# Patient Record
Sex: MALE | Race: WHITE | Employment: FULL TIME | URBAN - METROPOLITAN AREA
[De-identification: names, ages, dates, MRNs, and addresses within clinical notes are randomized per-mention and may not be internally consistent; named-entity substitution may affect disease eponyms.]

---

## 2022-02-09 NOTE — PROGRESS NOTES
CARDIOLOGY CONSULTATION        Patient Name: Gianni Rasmussen  Primary Care physician: No primary care provider on file. Reason for Referral/Chief Complaint: Gianni Rasmussen is a 42 y.o. patient who is referred to cardiology clinic today for evaluation and treatment of chest pain. History of Present Illness:   Gianni Rasmussen is a 42 y.o. male here today as a new patient for evaluation of chest pain. He has a medical history including ***. He was referred by ***. Today ***    The patient denies chest pain, shortness of breath at rest and dyspnea with exertion. Denies palpitations, dizziness, near-syncope or pieter syncope. Denies paroxysmal nocturnal dyspnea, orthopnea, bendopnea, increasing lower extremity edema or weight gain. The patient endorses highest level of activity as ***. Past Medical History:   has no past medical history on file. Surgical History:   has no past surgical history on file. Social History:        Family History:  family history is not on file. Home Medications:  Were reviewed and are listed in nursing record and/or below  Prior to Admission medications    Not on File        CURRENT Medications:  No current facility-administered medications for this visit. Allergies:  Patient has no allergy information on record. Review of Systems:   A 14 point review of symptoms completed. Pertinent positives identified in the HPI, all other review of symptoms negative as below. Objective: There were no vitals filed for this visit. PHYSICAL EXAM:    ***  General:  Alert, cooperative, no distress, appears stated age   Head:  Normocephalic, atraumatic   Eyes:  Conjunctiva/corneas clear, anicteric sclerae    Nose: Nares normal, no drainage or sinus tenderness   Throat: No abnormalities of the lips, oral mucosa or tongue. Neck: Trachea midline.  Neck supple with no lymphadenopathy, thyroid not enlarged, symmetric, no tenderness/mass/nodules, no Jugular venous pressure elevation    Lungs:   Clear to auscultation bilaterally, no wheezes, no rales, no respiratory distress   Chest Wall:  No deformity or tenderness to palpation   Heart:  Regular rate and rhythm, normal S1, normal S2, no murmur, no rub, no S3/S4, PMI non-displaced. Abdomen:   Soft, non-tender, with normoactive bowel sounds. No masses, no hepatosplenomegaly   Extremities: No cyanosis, clubbing or pitting edema. Vascular: 2+ radial, brachial, femoral, dorsalis pedis and posterior tibial pulses bilaterally. Brisk carotid upstrokes without carotid bruit. Skin: Skin color, texture, turgor are normal with no rashes or ulceration. Pysch: Euthymic mood, appropriate affect   Neurologic: Oriented to person, place and time. No slurred speech or facial asymmetry. No motor or sensory deficits on gross examination. Labs:   CBC: No results found for: WBC, RBC, HGB, HCT, MCV, RDW, PLT  CMP:No results found for: NA, K, CL, CO2, BUN, CREATININE, GFRAA, AGRATIO, LABGLOM, GLUCOSE, PROT, CALCIUM, BILITOT, ALKPHOS, AST, ALT  PT/INR:  No results found for: PTINR  HgBA1c:No results found for: LABA1C  No results found for: CKTOTAL, CKMB, CKMBINDEX, TROPONINI      Cardiac Data:     EKG:     Echo:    Stress Test:    Cardiac catheterization:    Additional studies:     Impression and Plan:      1. There is no problem list on file for this patient. Patient Plan:  1. ***          ***      ***      I will address the patient's cardiac risk factors and adjusted pharmacologic treatment as needed. In addition, I have reinforced the need for patient directed risk factor modification. All questions and concerns were addressed to the patient/family. Alternatives to my treatment were discussed. Thank you for allowing us to participate in the care of Mariana Cantrell. Please call me with any questions [phone].     Mary Bradford MD, [address]  ([address]) [address]  [phone] [address] [place] [address]  2/9/2022 1:08 PM

## 2022-02-11 ENCOUNTER — OFFICE VISIT (OUTPATIENT)
Dept: CARDIOLOGY CLINIC | Age: 42
End: 2022-02-11
Payer: COMMERCIAL

## 2022-02-11 VITALS
HEIGHT: 74 IN | WEIGHT: 296 LBS | DIASTOLIC BLOOD PRESSURE: 62 MMHG | HEART RATE: 92 BPM | BODY MASS INDEX: 37.99 KG/M2 | OXYGEN SATURATION: 98 % | SYSTOLIC BLOOD PRESSURE: 112 MMHG

## 2022-02-11 DIAGNOSIS — Z76.89 ESTABLISHING CARE WITH NEW DOCTOR, ENCOUNTER FOR: Primary | ICD-10-CM

## 2022-02-11 DIAGNOSIS — E78.5 HYPERLIPIDEMIA, UNSPECIFIED HYPERLIPIDEMIA TYPE: ICD-10-CM

## 2022-02-11 DIAGNOSIS — I10 PRIMARY HYPERTENSION: ICD-10-CM

## 2022-02-11 DIAGNOSIS — R07.9 CHEST PAIN, UNSPECIFIED TYPE: ICD-10-CM

## 2022-02-11 PROCEDURE — 93000 ELECTROCARDIOGRAM COMPLETE: CPT | Performed by: INTERNAL MEDICINE

## 2022-02-11 PROCEDURE — 99204 OFFICE O/P NEW MOD 45 MIN: CPT | Performed by: INTERNAL MEDICINE

## 2022-02-11 RX ORDER — CARVEDILOL 6.25 MG/1
6.25 TABLET ORAL 2 TIMES DAILY WITH MEALS
COMMUNITY

## 2022-02-11 NOTE — PROGRESS NOTES
CARDIOLOGY CONSULTATION        Patient Name: Shannan Mchugh  Primary Care physician: No primary care provider on file. Reason for Referral/Chief Complaint: Shannan Mchugh is a 42 y.o. patient who is referred to cardiology clinic today for evaluation and treatment of chest pain. History of Present Illness:   Shannan Mchugh is a 42 y.o. male here today as a new patient for evaluation of chest pain. He has a medical history including hypertension, tobacco abuse,  chest pain, and chronic leg pain. Today 2/11/22, he reports he states that his chest pain feels like a vice , band-like discomfort around his chest. It is associated with a stabbing pain that travels up into his shoulder at times as well. He was recently evaluated by Dr. Jeffry Kimball with [facility]. He underwent a stress echo as well as a exercise stress test which showed no evidence of stress induced ischemia by EKG though IVCD noted at baseline. Echo images were sub-optimal post-exercise without evidence of ischemia. He performed a good work load. Reports these had chest pain last 1 week ago. States he is taken his medication for his blood pressure each day since that time as prescribed and has not had pain since. Shocked by his blood pressure today which is improved. Continues to work without limitations. He also has a history of lower leg pain. Describes this as sharp pain at the bottom of his foot. Some exertional component. Vascular studies/CARLOTA were normal at his recent hospitalization evaluation. Denies palpitations, dizziness, near-syncope or pieter syncope. Denies paroxysmal nocturnal dyspnea, orthopnea, bendopnea, increasing lower extremity edema or weight gain. The patient endorses highest level of activity as working as a . Continues to smoke 1 PPD x about 25 years.      Past Medical History:  Chest pain  Hypertension  Obesity  Tobacco abuse  Leg pain     Surgical History:  Patient denies prior surgical history. Social History:   reports that he has been smoking cigarettes. He has never used smokeless tobacco. He reports current alcohol use. He reports previous drug use. Family History:  Father- AAA repair  Mother- COPD  Sister- healthy  Brother- alcoholic, esophageal varices (deseased)        Home Medications:  Were reviewed and are listed in nursing record and/or below  Prior to Admission medications    Medication Sig Start Date End Date Taking? Authorizing Provider   carvedilol (COREG) 6.25 MG tablet Take 6.25 mg by mouth 2 times daily (with meals)   Yes Historical Provider, MD      Patient states she is taking over-the-counter medications including:  Coenzyme Q 10  L-arginine    CURRENT Medications:  No current facility-administered medications for this visit. Allergies:  Pc pen vk [penicillin v]     Review of Systems:   A 14 point review of symptoms completed. Pertinent positives identified in the HPI, all other review of symptoms negative as below. Objective:     Vitals:    02/11/22 0923   BP: 112/62   Pulse: 92   SpO2: 98%    Weight: 296 lb (134.3 kg)       PHYSICAL EXAM:    General:  Alert, cooperative, no distress, appears stated age   Head:  Normocephalic, atraumatic   Eyes:  Conjunctiva/corneas clear, anicteric sclerae    Nose: Nares normal, no drainage or sinus tenderness   Throat: No abnormalities of the lips, oral mucosa or tongue. Neck: Trachea midline. Neck supple with no lymphadenopathy, thyroid not enlarged, symmetric, no tenderness/mass/nodules, no Jugular venous pressure elevation    Lungs:   Clear to auscultation bilaterally, no wheezes, no rales, no respiratory distress   Chest Wall:  No deformity or tenderness to palpation   Heart:  Regular rate and rhythm, normal S1, normal S2, no murmur, no rub, no S3/S4, PMI non-displaced. Abdomen:   Obese abd, Soft, non-tender, with normoactive bowel sounds.  No masses, no hepatosplenomegaly   Extremities: No cyanosis, clubbing or pitting edema. Vascular: 2+ radial, dorsalis pedis and posterior tibial pulses bilaterally. Brisk carotid upstrokes without carotid bruit. Skin: Skin color, texture, turgor are normal with no rashes or ulceration. Pysch: Euthymic mood, appropriate affect, pressured speech   Neurologic: Oriented to person, place and time. No slurred speech or facial asymmetry. No motor or sensory deficits on gross examination. Labs:   CBC: No results found for: WBC, RBC, HGB, HCT, MCV, RDW, PLT  CMP:No results found for: NA, K, CL, CO2, BUN, CREATININE, GFRAA, AGRATIO, LABGLOM, GLUCOSE, PROT, CALCIUM, BILITOT, ALKPHOS, AST, ALT  PT/INR:  No results found for: PTINR  HgBA1c:No results found for: LABA1C  No results found for: CKTOTAL, CKMB, CKMBINDEX, TROPONINI      Cardiac Data:     EKG personally reviewed today with my interpretation: Normal sinus rhythm, normal axis, nonspecific QRS widening 110 ms. EKG 11/28/21:  Rate: 124 P: 55  VA: 153 QRS: 84  QRSD: 116 T: 19  QT: 312   QTc: 448   Interpretive Statements  SINUS TACHYCARDIA  MODERATE INTRAVENTRICULAR CONDUCTION DELAY  MINIMAL ST DEPRESSION  ABNORMAL RHYTHM ECG     Echo Stress test: 12/2021  IMPRESSION    CONCLUSIONS     No evidence of stress induced ischemia by echo or ekg criterie but reduced specificity due to poor image quality post   exercsie     Normal LV funciton at rest     No signficant valvular regurgitant lesions      Stress Test: 2021  1. METS achieved: 9.8   2. Walked 9:00 minutes on Full Neil Protocol   3. Target HR was achieved. Reached heart rate of 150, which is 85% of maximum   age predicted target heart rate. 4. Termination of test due to: SOB and Fatigue. 5. Symptoms: c/o chest pain in second stage that reached an \"8\" in third   stage but resolved in recovery. Patient was very SOB at peak exercise. 6. Echo imaging reported separately. Definity was used to enhance resting and   stress images per protocol. _________________________Physician Interpretation_________________________   Conclusion:   Good Exercise Capacity   Blood Pressure response: Normal   Baseline EKG: Sinus Rhythm, Early repolarization   Stress EKG: No ischemic EKG changes with exercise   Arrhythmias: Occasional PVCs   Electronically Signed On 12- 14:43:06 EST by Isabel Carvajal, DO     Additional studies:     [facility] lower extremity 2021  CONCLUSIONS     Normal hemodynamics of both lower extremities at rest. PVR's and Doppler waveforms are within normal limits   bilaterally. No evidence of arterial insufficiency identified in the bilateral lower extremities, at rest. Bilateral CARLOTA's are   within normal limits. Impression and Plan:      1. Chest pain, atypical  2. Hypertension, controlled  3. Obesity with BMI 38  4. Tobacco abuse, ongoing, precontemplative    The ASCVD Risk score (Danae Ku, et al., 2013) failed to calculate for the following reasons:    Cannot find a previous HDL lab    Cannot find a previous total cholesterol lab      Patient Active Problem List   Diagnosis    Hypertension    Chest pain       PLAN:  1. Will obtain rest transthoracic echocardiogram for evaluation of underlying cardiac structure and function. 2. Repeat stress test as the prior had reduced sensitivity due to poor echo quality images post-exercise and baseline EKG abnormalities may have made EKG interpretation unreliable. Proceed with GXT myoview. 3. Check lipids as elevated in the past  4. Strongly recommended complete smoking cessation with resources given  5. If the above work-up is negative, we will encourage modest weight loss through implementing appropriate dietary measures as well as initiation of a graded exercise program with the ultimate goal of 150 minutes of aerobic exercise weekly. Consider referral to bariatric surgery      Follow up with me based on these results      Scribe's attestation:   This note was scribed in the presence of Dr. Sallie Julian M.D. By Bob Coleman RN    The scribes documentation has been prepared under my direction and personally reviewed by me in its entirety. I confirm that the note above accurately reflects all work, treatment, procedures, and medical decision making performed by me. Adan Crump MD, personally performed the services described in this documentation as scribed by Bob Coleman RN in my presence, and it is both accurate and complete to the best of our ability. I will address the patient's cardiac risk factors and adjusted pharmacologic treatment as needed. In addition, I have reinforced the need for patient directed risk factor modification. All questions and concerns were addressed to the patient/family. Alternatives to my treatment were discussed. Thank you for allowing us to participate in the care of Alta Milton. Please call me with any questions [phone].     Simin Keller MD, [facility]  Cardiovascular Disease  [address]  [phone] [facility]  [phone] [address] [place] [address]  2/11/2022 9:42 AM

## 2022-02-11 NOTE — PATIENT INSTRUCTIONS
Patient Plan:  1. Echocardiogram to evaluate heart structure and function. Your provider has ordered testing for further evaluation. An order has been included in your paper work.  To schedule outpatient testing, contact Central Scheduling by calling [address] [place] [address] ([phone]). 2. Lipid panel to check cholesterol  3. Stress test to evaluate any areas of ischemia, as a cause for your chest pain.   4. We recommend smoking cessation, call [phone]mon for free samples and counseling    Follow up with me based on these results

## 2022-03-01 ENCOUNTER — TELEPHONE (OUTPATIENT)
Dept: CARDIOLOGY CLINIC | Age: 42
End: 2022-03-01

## 2022-03-01 NOTE — TELEPHONE ENCOUNTER
LVM for pt to call back regarding appointment on 3/4/22 with LUCY. Pt already seeing TALM. No need to see Saba Coyne too  Appointment can be canceled with Saba Coyne.

## 2022-03-03 ENCOUNTER — HOSPITAL ENCOUNTER (OUTPATIENT)
Age: 42
Discharge: HOME | End: 2022-03-03
Payer: COMMERCIAL

## 2022-03-03 ENCOUNTER — HOSPITAL ENCOUNTER (OUTPATIENT)
Dept: NON INVASIVE DIAGNOSTICS | Age: 42
Discharge: HOME | End: 2022-03-03
Payer: COMMERCIAL

## 2022-03-03 ENCOUNTER — HOSPITAL ENCOUNTER (OUTPATIENT)
Dept: NUCLEAR MEDICINE | Age: 42
Discharge: HOME | End: 2022-03-03
Payer: COMMERCIAL

## 2022-03-03 ENCOUNTER — HOSPITAL ENCOUNTER (OUTPATIENT)
Dept: CARDIOLOGY | Age: 42
Discharge: HOME | End: 2022-03-03
Payer: COMMERCIAL

## 2022-03-03 DIAGNOSIS — I10 PRIMARY HYPERTENSION: ICD-10-CM

## 2022-03-03 DIAGNOSIS — R07.9 CHEST PAIN, UNSPECIFIED TYPE: ICD-10-CM

## 2022-03-03 DIAGNOSIS — E78.5 HYPERLIPIDEMIA, UNSPECIFIED HYPERLIPIDEMIA TYPE: ICD-10-CM

## 2022-03-03 LAB
CHOLESTEROL, FASTING: 244 MG/DL (ref 0–199)
HDLC SERPL-MCNC: 42 MG/DL (ref 40–60)
LDL CHOLESTEROL CALCULATED: 171 MG/DL
LV EF: 55 %
LV EF: 70 %
LVEF MODALITY: NORMAL
LVEF MODALITY: NORMAL
TRIGLYCERIDE, FASTING: 155 MG/DL (ref 0–150)
VLDLC SERPL CALC-MCNC: 31 MG/DL

## 2022-03-03 PROCEDURE — 93306 TTE W/DOPPLER COMPLETE: CPT

## 2022-03-03 PROCEDURE — 6360000004 HC RX CONTRAST MEDICATION: Performed by: INTERNAL MEDICINE

## 2022-03-03 PROCEDURE — A9502 TC99M TETROFOSMIN: HCPCS | Performed by: INTERNAL MEDICINE

## 2022-03-03 PROCEDURE — 78452 HT MUSCLE IMAGE SPECT MULT: CPT

## 2022-03-03 PROCEDURE — C8929 TTE W OR WO FOL WCON,DOPPLER: HCPCS

## 2022-03-03 PROCEDURE — 80061 LIPID PANEL: CPT

## 2022-03-03 PROCEDURE — 93017 CV STRESS TEST TRACING ONLY: CPT

## 2022-03-03 PROCEDURE — 3430000000 HC RX DIAGNOSTIC RADIOPHARMACEUTICAL: Performed by: INTERNAL MEDICINE

## 2022-03-03 PROCEDURE — 36415 COLL VENOUS BLD VENIPUNCTURE: CPT

## 2022-03-03 RX ADMIN — TETROFOSMIN 31.7 MILLICURIE: 1.38 INJECTION, POWDER, LYOPHILIZED, FOR SOLUTION INTRAVENOUS at 15:34

## 2022-03-03 RX ADMIN — PERFLUTREN 2.2 MG: 6.52 INJECTION, SUSPENSION INTRAVENOUS at 16:02

## 2022-03-03 RX ADMIN — TETROFOSMIN 11.9 MILLICURIE: 1.38 INJECTION, POWDER, LYOPHILIZED, FOR SOLUTION INTRAVENOUS at 13:25

## 2022-03-03 NOTE — PROGRESS NOTES
A ABRAHAM GXT stress test was completed on this patient as ordered. The patient tolerated the procedure well. Awaiting stress imaging at this time.

## 2022-03-04 ENCOUNTER — TELEPHONE (OUTPATIENT)
Dept: CARDIOLOGY CLINIC | Age: 42
End: 2022-03-04

## 2022-03-04 DIAGNOSIS — Z79.899 MEDICATION MANAGEMENT: Primary | ICD-10-CM

## 2022-03-04 NOTE — TELEPHONE ENCOUNTER
----- Message from Teresita David MD sent at 3/3/2022  4:40 PM EST -----  Please let the patient know he did an excellent workload on the treadmill. Normal blood pressure response. Heart rate did reach goal. No chest pain reported to me during the test which is great. His heart looks normal during the test with no indication of underlying blockage. This coupled with normal echocardiogram today speak towards a reassuring prognosis. Recommend give serious consideration to quitting smoking and start graded exercise program with goal for slow progressive weight loss. Follow up as needed.        Please let the patient know the following: Normal echocardiogram showing normal heart strength and no significant valve disease.

## 2022-03-04 NOTE — TELEPHONE ENCOUNTER
----- Message from Justyna Kearns MD sent at 3/4/2022 11:09 AM EST -----  Pls let pt know bad cholesterol or \"LDL' is very high. Low risk by risk calculator for heart attack/stroke and given recently performed neg stress testing. Recommend trial of diet and exercise with repeat 3 months with PCP. If remains elevated at that time would start treatment.

## 2022-03-04 NOTE — TELEPHONE ENCOUNTER
Spoke with patient, relayed [facility] rsults message and instructions. He request lipid panel be ordered through [facility], order placed for 3 months instructed to be fasting. He VU.